# Patient Record
Sex: FEMALE | Race: WHITE | NOT HISPANIC OR LATINO
[De-identification: names, ages, dates, MRNs, and addresses within clinical notes are randomized per-mention and may not be internally consistent; named-entity substitution may affect disease eponyms.]

---

## 2020-01-13 PROBLEM — Z00.00 ENCOUNTER FOR PREVENTIVE HEALTH EXAMINATION: Status: ACTIVE | Noted: 2020-01-13

## 2020-02-06 ENCOUNTER — APPOINTMENT (OUTPATIENT)
Dept: ENDOCRINOLOGY | Facility: CLINIC | Age: 65
End: 2020-02-06
Payer: COMMERCIAL

## 2020-02-06 VITALS
DIASTOLIC BLOOD PRESSURE: 82 MMHG | HEIGHT: 65 IN | SYSTOLIC BLOOD PRESSURE: 150 MMHG | BODY MASS INDEX: 26.33 KG/M2 | TEMPERATURE: 97.8 F | HEART RATE: 55 BPM | OXYGEN SATURATION: 99 % | WEIGHT: 158 LBS

## 2020-02-06 DIAGNOSIS — M81.0 AGE-RELATED OSTEOPOROSIS W/OUT CURRENT PATHOLOGICAL FRACTURE: ICD-10-CM

## 2020-02-06 DIAGNOSIS — Z80.0 FAMILY HISTORY OF MALIGNANT NEOPLASM OF DIGESTIVE ORGANS: ICD-10-CM

## 2020-02-06 DIAGNOSIS — Z87.891 PERSONAL HISTORY OF NICOTINE DEPENDENCE: ICD-10-CM

## 2020-02-06 DIAGNOSIS — Z86.39 PERSONAL HISTORY OF OTHER ENDOCRINE, NUTRITIONAL AND METABOLIC DISEASE: ICD-10-CM

## 2020-02-06 DIAGNOSIS — Z80.9 FAMILY HISTORY OF MALIGNANT NEOPLASM, UNSPECIFIED: ICD-10-CM

## 2020-02-06 DIAGNOSIS — Z87.39 PERSONAL HISTORY OF OTHER DISEASES OF THE MUSCULOSKELETAL SYSTEM AND CONNECTIVE TISSUE: ICD-10-CM

## 2020-02-06 DIAGNOSIS — Z82.62 FAMILY HISTORY OF OSTEOPOROSIS: ICD-10-CM

## 2020-02-06 PROCEDURE — 99244 OFF/OP CNSLTJ NEW/EST MOD 40: CPT

## 2020-02-06 RX ORDER — IBANDRONATE SODIUM 150 MG/1
150 TABLET, FILM COATED ORAL
Refills: 0 | Status: ACTIVE | COMMUNITY

## 2020-02-06 RX ORDER — ROSUVASTATIN CALCIUM 10 MG/1
10 TABLET, FILM COATED ORAL
Refills: 0 | Status: ACTIVE | COMMUNITY

## 2020-02-15 ENCOUNTER — APPOINTMENT (OUTPATIENT)
Dept: ULTRASOUND IMAGING | Facility: CLINIC | Age: 65
End: 2020-02-15
Payer: COMMERCIAL

## 2020-02-15 ENCOUNTER — OUTPATIENT (OUTPATIENT)
Dept: OUTPATIENT SERVICES | Facility: HOSPITAL | Age: 65
LOS: 1 days | End: 2020-02-15

## 2020-02-15 PROCEDURE — 76536 US EXAM OF HEAD AND NECK: CPT | Mod: 26

## 2020-06-16 ENCOUNTER — APPOINTMENT (OUTPATIENT)
Dept: ENDOCRINOLOGY | Facility: CLINIC | Age: 65
End: 2020-06-16
Payer: COMMERCIAL

## 2020-06-16 VITALS
WEIGHT: 159 LBS | BODY MASS INDEX: 26.46 KG/M2 | SYSTOLIC BLOOD PRESSURE: 144 MMHG | DIASTOLIC BLOOD PRESSURE: 91 MMHG | HEART RATE: 73 BPM

## 2020-06-16 DIAGNOSIS — E04.9 NONTOXIC GOITER, UNSPECIFIED: ICD-10-CM

## 2020-06-16 PROCEDURE — 99214 OFFICE O/P EST MOD 30 MIN: CPT | Mod: 25

## 2020-06-16 PROCEDURE — 10005 FNA BX W/US GDN 1ST LES: CPT

## 2020-06-17 ENCOUNTER — RESULT REVIEW (OUTPATIENT)
Age: 65
End: 2020-06-17

## 2020-06-19 PROBLEM — E04.9 THYROID GOITER: Status: ACTIVE | Noted: 2020-02-06

## 2020-06-19 NOTE — PROCEDURE
[Fine Needle Aspiration] : Fine needle aspiration ~T ~C was performed. [Risks] : risks [Benefits] : benefits [Alternatives] : alternatives [Consent Obtained] : Written consent was obtained prior to the procedure and is detailed in the patient's record [Patient] : the patient [Supine] : The patient was placed in the supine position with the neck extended as tolerated. [Ethyl Chloride] : ethyl chloride [Alcohol] : with alcohol [25 gauge 1.5 inch] : A 25 gauge 1.5 inch needle was used [3 Passes] : 3 passes were made through the mass [Ultrasonic Guidance] : ultrasound guidance was employed [Afirma] : Afirma [Sent to Histology] : The specimens were prepared in the usual manner and sent to histology. [Tolerated Well] : the patient tolerated the procedure well [Vital Signs Stable] : the vital signs were stable [No Complications] : There were no complications [Hemostasis] : hemostasis was assured and the patient was discharged in satisfactory condition [Instructions Given] : Handouts/patient instructions were given to patient [PRN] : as needed. [de-identified] : R thyroid lobe

## 2020-06-19 NOTE — ASSESSMENT
[FreeTextEntry1] : Thyroid nodule:\par \par R upper lobe nodule measuring 2.4 x 1 x 2 cm was successfully biopsied under sonographic guidance. An extra sample for PRN Afirma testing was obtained if indeterminate results are present\par

## 2021-07-22 ENCOUNTER — APPOINTMENT (OUTPATIENT)
Dept: OTOLARYNGOLOGY | Facility: CLINIC | Age: 66
End: 2021-07-22
Payer: COMMERCIAL

## 2021-07-22 VITALS — WEIGHT: 156 LBS | TEMPERATURE: 95.8 F | HEIGHT: 66 IN | BODY MASS INDEX: 25.07 KG/M2

## 2021-07-22 DIAGNOSIS — K21.9 GASTRO-ESOPHAGEAL REFLUX DISEASE W/OUT ESOPHAGITIS: ICD-10-CM

## 2021-07-22 DIAGNOSIS — Z80.8 FAMILY HISTORY OF MALIGNANT NEOPLASM OF OTHER ORGANS OR SYSTEMS: ICD-10-CM

## 2021-07-22 DIAGNOSIS — Z81.8 FAMILY HISTORY OF OTHER MENTAL AND BEHAVIORAL DISORDERS: ICD-10-CM

## 2021-07-22 DIAGNOSIS — Z86.39 PERSONAL HISTORY OF OTHER ENDOCRINE, NUTRITIONAL AND METABOLIC DISEASE: ICD-10-CM

## 2021-07-22 DIAGNOSIS — Z80.9 FAMILY HISTORY OF MALIGNANT NEOPLASM, UNSPECIFIED: ICD-10-CM

## 2021-07-22 DIAGNOSIS — Z80.0 FAMILY HISTORY OF MALIGNANT NEOPLASM OF DIGESTIVE ORGANS: ICD-10-CM

## 2021-07-22 DIAGNOSIS — Z78.9 OTHER SPECIFIED HEALTH STATUS: ICD-10-CM

## 2021-07-22 PROCEDURE — 31575 DIAGNOSTIC LARYNGOSCOPY: CPT

## 2021-07-22 PROCEDURE — 92567 TYMPANOMETRY: CPT

## 2021-07-22 PROCEDURE — 99072 ADDL SUPL MATRL&STAF TM PHE: CPT

## 2021-07-22 PROCEDURE — 92557 COMPREHENSIVE HEARING TEST: CPT

## 2021-07-22 PROCEDURE — 99204 OFFICE O/P NEW MOD 45 MIN: CPT | Mod: 25

## 2021-07-22 RX ORDER — DIPHENHYDRAMINE HYDROCHLORIDE, ZINC ACETATE 20; 1 MG/G; MG/G
CREAM TOPICAL
Refills: 0 | Status: ACTIVE | COMMUNITY

## 2021-07-22 RX ORDER — IBANDRONATE SODIUM 150 MG/1
TABLET ORAL
Refills: 0 | Status: ACTIVE | COMMUNITY

## 2021-07-22 RX ORDER — BENZOCAINE/BENZALKONIUM/ALOE/E 5 %-0.13 %
10 CREAM (GRAM) TOPICAL
Refills: 0 | Status: ACTIVE | COMMUNITY

## 2021-07-22 RX ORDER — CHROMIUM 200 MCG
TABLET ORAL
Refills: 0 | Status: ACTIVE | COMMUNITY

## 2021-07-22 RX ORDER — PNV NO.95/FERROUS FUM/FOLIC AC 28MG-0.8MG
TABLET ORAL
Refills: 0 | Status: ACTIVE | COMMUNITY

## 2021-07-22 RX ORDER — SELENIUM 50 MCG
TABLET ORAL
Refills: 0 | Status: ACTIVE | COMMUNITY

## 2021-07-22 RX ORDER — FLUTICASONE PROPIONATE 50 MCG
SPRAY, SUSPENSION NASAL
Refills: 0 | Status: ACTIVE | COMMUNITY

## 2021-07-22 NOTE — CONSULT LETTER
[Dear  ___] : Dear  [unfilled], [Consult Letter:] : I had the pleasure of evaluating your patient, [unfilled]. [Please see my note below.] : Please see my note below. [Consult Closing:] : Thank you very much for allowing me to participate in the care of this patient.  If you have any questions, please do not hesitate to contact me. [Sincerely,] : Sincerely, [FreeTextEntry3] : Iris Winter MD\par

## 2021-07-22 NOTE — HISTORY OF PRESENT ILLNESS
[de-identified] : YVETTE VOGT is a 66 year patient Here for evaluation for hearing loss. 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 She feels that the left ear is better. She also has bowel tinnitus. She denies otalgia, diarrhea, or dizziness. There is a family history of age-related hearing loss. She had her prior audiogram from 1986 and 2016 which were reviewed.\par \par History of recurrent ear infections-she may have had recurrent infections as a child\par Prior ear surgery-no\par History of otologic trauma-no\par Noise exposure-she does have a history of some noise exposure\par Family history of hearing loss-there is a family history of age-related hearing loss\par Prior audiogram-1986 and 2016\par \par She also has a second problem of allergies. She has had a cough and post nasal drip since January. She said this started after she had a smoker living in the apartment below her. they have since moved.  She started using Flonase. She does have the clicking sensation in her throat at night with possible wheezing. She also has a postnasal drip. She has no throat pain, dysphagia, voice change, or bleeding. She does have throat clearing at night. She denies nasal obstruction or congestion although she is on Flonase so she is not sure if they were there before she started using the medication. She has no nasal drainage. She does not smoke. She denies a known history of reflux

## 2021-07-30 ENCOUNTER — TRANSCRIPTION ENCOUNTER (OUTPATIENT)
Age: 66
End: 2021-07-30

## 2021-08-02 ENCOUNTER — APPOINTMENT (OUTPATIENT)
Dept: OTOLARYNGOLOGY | Facility: CLINIC | Age: 66
End: 2021-08-02
Payer: COMMERCIAL

## 2021-08-02 DIAGNOSIS — R09.82 POSTNASAL DRIP: ICD-10-CM

## 2021-08-02 DIAGNOSIS — R04.0 EPISTAXIS: ICD-10-CM

## 2021-08-02 PROCEDURE — 99213 OFFICE O/P EST LOW 20 MIN: CPT | Mod: 25

## 2021-08-02 PROCEDURE — 31238 NSL/SINS NDSC SRG NSL HEMRRG: CPT

## 2021-08-02 NOTE — HISTORY OF PRESENT ILLNESS
[de-identified] : Patient seen by Dr. Winter July 22 for sensorineural hearing loss, allergic rhinitis, laryngopharyngeal reflux. He recommended hearing aid evaluation, allergy medications, Pepcid for 2-3 weeks with reflux precautions. \par \par 4 days after that visit developed intermittant left-sided epistaxis that was quite significant. She stopped for 4 days but it happened again 5 more times. This morning she had yet another episode after a coated test for work. She has had intermittent nosebleeds in the past associated with wintertime environment. She is not on any blood thinners or aspirin, denies manipulation

## 2021-08-02 NOTE — ASSESSMENT
[FreeTextEntry1] : Epistaxis, status post electrocautery. Recommended no trauma to the area, application of bacitracin, humidifier, and open-mouth sneezing. She will also avoid straining. Followup if she has further epistaxis otherwise as previously planned with Dr. Winter

## 2021-08-06 ENCOUNTER — APPOINTMENT (OUTPATIENT)
Dept: OTOLARYNGOLOGY | Facility: CLINIC | Age: 66
End: 2021-08-06
Payer: SELF-PAY

## 2021-08-06 PROCEDURE — 92591 HEARING AID EXAMINATION & SELECTION BINAURAL: CPT

## 2021-08-20 ENCOUNTER — APPOINTMENT (OUTPATIENT)
Dept: OTOLARYNGOLOGY | Facility: CLINIC | Age: 66
End: 2021-08-20
Payer: SELF-PAY

## 2021-08-20 PROCEDURE — V5261I: CUSTOM

## 2021-09-17 ENCOUNTER — APPOINTMENT (OUTPATIENT)
Dept: OTOLARYNGOLOGY | Facility: CLINIC | Age: 66
End: 2021-09-17
Payer: SELF-PAY

## 2021-09-17 PROCEDURE — 92593: CPT | Mod: NC

## 2021-10-01 ENCOUNTER — APPOINTMENT (OUTPATIENT)
Dept: OTOLARYNGOLOGY | Facility: CLINIC | Age: 66
End: 2021-10-01
Payer: SELF-PAY

## 2021-10-01 PROCEDURE — 92593: CPT | Mod: NC

## 2021-10-22 ENCOUNTER — APPOINTMENT (OUTPATIENT)
Dept: OTOLARYNGOLOGY | Facility: CLINIC | Age: 66
End: 2021-10-22
Payer: SELF-PAY

## 2021-10-22 PROCEDURE — 92593: CPT | Mod: NC

## 2021-11-05 ENCOUNTER — APPOINTMENT (OUTPATIENT)
Dept: OTOLARYNGOLOGY | Facility: CLINIC | Age: 66
End: 2021-11-05

## 2021-11-29 ENCOUNTER — APPOINTMENT (OUTPATIENT)
Dept: OTOLARYNGOLOGY | Facility: CLINIC | Age: 66
End: 2021-11-29
Payer: SELF-PAY

## 2021-11-29 PROCEDURE — 92593: CPT | Mod: NC

## 2022-01-02 ENCOUNTER — TRANSCRIPTION ENCOUNTER (OUTPATIENT)
Age: 67
End: 2022-01-02

## 2022-01-28 ENCOUNTER — APPOINTMENT (OUTPATIENT)
Dept: OTOLARYNGOLOGY | Facility: CLINIC | Age: 67
End: 2022-01-28
Payer: SELF-PAY

## 2022-01-28 PROCEDURE — 92593: CPT | Mod: NC

## 2022-02-28 ENCOUNTER — OUTPATIENT (OUTPATIENT)
Dept: OUTPATIENT SERVICES | Facility: HOSPITAL | Age: 67
LOS: 1 days | End: 2022-02-28
Payer: COMMERCIAL

## 2022-02-28 PROCEDURE — 71046 X-RAY EXAM CHEST 2 VIEWS: CPT

## 2022-02-28 PROCEDURE — 71046 X-RAY EXAM CHEST 2 VIEWS: CPT | Mod: 26

## 2022-03-02 ENCOUNTER — FORM ENCOUNTER (OUTPATIENT)
Age: 67
End: 2022-03-02

## 2022-03-04 ENCOUNTER — LABORATORY RESULT (OUTPATIENT)
Age: 67
End: 2022-03-04

## 2022-03-04 ENCOUNTER — APPOINTMENT (OUTPATIENT)
Dept: PULMONOLOGY | Facility: CLINIC | Age: 67
End: 2022-03-04
Payer: COMMERCIAL

## 2022-03-04 VITALS
WEIGHT: 140 LBS | BODY MASS INDEX: 22.5 KG/M2 | DIASTOLIC BLOOD PRESSURE: 101 MMHG | HEART RATE: 79 BPM | OXYGEN SATURATION: 98 % | RESPIRATION RATE: 12 BRPM | SYSTOLIC BLOOD PRESSURE: 150 MMHG | TEMPERATURE: 97 F | HEIGHT: 66 IN

## 2022-03-04 PROCEDURE — 99204 OFFICE O/P NEW MOD 45 MIN: CPT

## 2022-03-04 NOTE — HISTORY OF PRESENT ILLNESS
[TextBox_4] : 65 yo F with history of mild asthma, HLD, osteoporosis, Hypothyroidism, here for management of her asthma. \par A few weeks started coughing, wheezing, post nasal drip and "feeling crummy" overall. Thought that she had silent reflux reflux, for which she saw the GI but her breathing was more of a dire issue. Then Dr. Shah started her on Singulair 4 days ago and it has made a difference. Overall, feels btter but still has coughing fits and wheezing at night. given these recent symptoms she is unable to walk as much as she used to and now the walk to work which previously took her 20 min takes an hour.  long time ago was allergic to albuterol.\par \par SH: Never smoker. no drug use. 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

## 2022-03-04 NOTE — PHYSICAL EXAM
[No Acute Distress] : no acute distress [Turbinate hypertrophy] : turbinate hypertrophy [Supple] : supple [Normal Rate/Rhythm] : normal rate/rhythm [Normal S1, S2] : normal s1, s2 [No Murmurs] : no murmurs [No Resp Distress] : no resp distress [No Acc Muscle Use] : no acc muscle use [Wheeze] : wheeze [Gait - Sufficient For Exercise Testing] : gait sufficient for exercise testing [No Clubbing] : no clubbing [No Edema] : no edema [Oriented x3] : oriented x3 [Normal Affect] : normal affect

## 2022-03-04 NOTE — ASSESSMENT
[FreeTextEntry1] : Data Reviewed: \par CXR 2-28-22 clear lung fields\par \par Impression/Plan:\par 1. Asthma currently uncontrolled\par - trial of breo (sample given), c/w singulair\par - check IgE, asthma profile, eosinophil today\par - pft prior to next visit\par \par 2. Post nasal drip\par - nasal slaine rinses\par \par RTC in 2 weeks

## 2022-03-04 NOTE — CONSULT LETTER
[Dear  ___] : Dear  [unfilled], [Consult Letter:] : I had the pleasure of evaluating your patient, [unfilled]. [Please see my note below.] : Please see my note below. [Consult Closing:] : Thank you very much for allowing me to participate in the care of this patient.  If you have any questions, please do not hesitate to contact me. [DrCarmen  ___] : Dr. AHNNA

## 2022-03-04 NOTE — REVIEW OF SYSTEMS
[Fatigue] : fatigue [Postnasal Drip] : postnasal drip [Cough] : cough [Wheezing] : wheezing [Negative] : Endocrine [Sputum] : no sputum [GERD] : no gerd [Abdominal Pain] : no abdominal pain [Diarrhea] : no diarrhea [Headache] : no headache [Dizziness] : no dizziness

## 2022-03-06 LAB — EOSINOPHIL # BLD MANUAL: 940 /UL

## 2022-03-11 LAB
A ALTERNATA IGE QN: <0.1 KUA/L
A FUMIGATUS IGE QN: <0.1 KUA/L
C ALBICANS IGE QN: <0.1 KUA/L
C HERBARUM IGE QN: <0.1 KUA/L
CAT DANDER IGE QN: <0.1 KUA/L
COMMON RAGWEED IGE QN: 0.51 KUA/L
D FARINAE IGE QN: <0.1 KUA/L
D PTERONYSS IGE QN: <0.1 KUA/L
DEPRECATED A ALTERNATA IGE RAST QL: 0
DEPRECATED A FUMIGATUS IGE RAST QL: 0
DEPRECATED C ALBICANS IGE RAST QL: 0
DEPRECATED C HERBARUM IGE RAST QL: 0
DEPRECATED CAT DANDER IGE RAST QL: 0
DEPRECATED COMMON RAGWEED IGE RAST QL: 1
DEPRECATED D FARINAE IGE RAST QL: 0
DEPRECATED D PTERONYSS IGE RAST QL: 0
DEPRECATED DOG DANDER IGE RAST QL: 0
DEPRECATED M RACEMOSUS IGE RAST QL: 0
DEPRECATED ROACH IGE RAST QL: 0
DEPRECATED TIMOTHY IGE RAST QL: 0
DEPRECATED WHITE OAK IGE RAST QL: 0
DOG DANDER IGE QN: <0.1 KUA/L
M RACEMOSUS IGE QN: <0.1 KUA/L
ROACH IGE QN: <0.1 KUA/L
TIMOTHY IGE QN: <0.1 KUA/L
TOTAL IGE SMQN RAST: 71 KU/L
WHITE OAK IGE QN: <0.1 KUA/L

## 2022-03-14 ENCOUNTER — LABORATORY RESULT (OUTPATIENT)
Age: 67
End: 2022-03-14

## 2022-03-17 ENCOUNTER — APPOINTMENT (OUTPATIENT)
Dept: PULMONOLOGY | Facility: CLINIC | Age: 67
End: 2022-03-17
Payer: COMMERCIAL

## 2022-03-17 PROCEDURE — 94729 DIFFUSING CAPACITY: CPT

## 2022-03-17 PROCEDURE — 94727 GAS DIL/WSHOT DETER LNG VOL: CPT

## 2022-03-17 PROCEDURE — 99214 OFFICE O/P EST MOD 30 MIN: CPT | Mod: 25

## 2022-03-17 PROCEDURE — 94060 EVALUATION OF WHEEZING: CPT

## 2022-03-17 PROCEDURE — ZZZZZ: CPT

## 2022-03-17 NOTE — HISTORY OF PRESENT ILLNESS
[TextBox_4] : 3-17-22 doing signficantly better on breo. stopped singulair three  day ago. no difficulty with dyspnea. \par \par 3-4-22\par 65 yo F with history of mild asthma, HLD, osteoporosis, Hypothyroidism, here for management of her asthma. \par A few weeks started coughing, wheezing, post nasal drip and "feeling crummy" overall. Thought that she had silent reflux reflux, for which she saw the GI but her breathing was more of a dire issue. Then Dr. Shah started her on Singulair 4 days ago and it has made a difference. Overall, feels btter but still has coughing fits and wheezing at night. given these recent symptoms she is unable to walk as much as she used to and now the walk to work which previously took her 20 min takes an hour.  long time ago was allergic to albuterol.\par \par SH: Never smoker. no drug use. 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

## 2022-03-17 NOTE — REVIEW OF SYSTEMS
[Wheezing] : wheezing [Negative] : Endocrine [Fatigue] : no fatigue [Postnasal Drip] : no postnasal drip [Cough] : no cough [Sputum] : no sputum [GERD] : no gerd [Abdominal Pain] : no abdominal pain [Diarrhea] : no diarrhea [Headache] : no headache [Dizziness] : no dizziness

## 2022-03-17 NOTE — ASSESSMENT
[FreeTextEntry1] : Data Reviewed: \par CXR 2-28-22 clear lung fields\par 3-2022 IgE 71, Eosinophil levels 940 \par \par PFT 3-17-22: Normal. no evidence of obstruction. \par \par Impression/Plan:\par 1. cough variant asthma\par - continue breo 100\par - stop singulair \par \par 2. Post nasal drip\par - nasal slaine rinses\par \par 3. HCM\par Flu 9-2021\par Covid-19 completed vaccination, and booster\par Pneumovax received PCV 13 in 2020 and PSV 23 in 2021\par \par RTC in 3-4 months

## 2022-03-17 NOTE — PHYSICAL EXAM
[No Acute Distress] : no acute distress [Turbinate hypertrophy] : turbinate hypertrophy [Supple] : supple [Normal Rate/Rhythm] : normal rate/rhythm [Normal S1, S2] : normal s1, s2 [No Murmurs] : no murmurs [No Resp Distress] : no resp distress [No Acc Muscle Use] : no acc muscle use [Gait - Sufficient For Exercise Testing] : gait sufficient for exercise testing [No Clubbing] : no clubbing [No Edema] : no edema [Oriented x3] : oriented x3 [Normal Affect] : normal affect [Clear to Auscultation Bilaterally] : clear to auscultation bilaterally

## 2022-04-06 RX ORDER — BUDESONIDE AND FORMOTEROL FUMARATE DIHYDRATE 80; 4.5 UG/1; UG/1
80-4.5 AEROSOL RESPIRATORY (INHALATION)
Qty: 1 | Refills: 6 | Status: ACTIVE | COMMUNITY
Start: 2022-04-06 | End: 1900-01-01

## 2022-04-06 RX ORDER — ALBUTEROL SULFATE 90 UG/1
108 (90 BASE) INHALANT RESPIRATORY (INHALATION)
Qty: 1 | Refills: 3 | Status: ACTIVE | COMMUNITY
Start: 2022-04-06 | End: 1900-01-01

## 2022-05-27 ENCOUNTER — APPOINTMENT (OUTPATIENT)
Dept: OTOLARYNGOLOGY | Facility: CLINIC | Age: 67
End: 2022-05-27
Payer: SELF-PAY

## 2022-05-27 PROCEDURE — 92593: CPT | Mod: NC

## 2022-07-20 ENCOUNTER — APPOINTMENT (OUTPATIENT)
Dept: PULMONOLOGY | Facility: CLINIC | Age: 67
End: 2022-07-20

## 2022-07-20 VITALS
WEIGHT: 141 LBS | DIASTOLIC BLOOD PRESSURE: 77 MMHG | SYSTOLIC BLOOD PRESSURE: 118 MMHG | HEART RATE: 70 BPM | BODY MASS INDEX: 23.49 KG/M2 | HEIGHT: 65 IN | TEMPERATURE: 98.2 F | OXYGEN SATURATION: 97 %

## 2022-07-20 DIAGNOSIS — J45.909 UNSPECIFIED ASTHMA, UNCOMPLICATED: ICD-10-CM

## 2022-07-20 DIAGNOSIS — R05.9 COUGH, UNSPECIFIED: ICD-10-CM

## 2022-07-20 PROCEDURE — 94060 EVALUATION OF WHEEZING: CPT | Mod: 1L

## 2022-07-20 PROCEDURE — 99214 OFFICE O/P EST MOD 30 MIN: CPT

## 2022-07-20 RX ORDER — FLUTICASONE FUROATE AND VILANTEROL TRIFENATATE 100; 25 UG/1; UG/1
100-25 POWDER RESPIRATORY (INHALATION) DAILY
Qty: 1 | Refills: 6 | Status: DISCONTINUED | COMMUNITY
Start: 2022-04-06 | End: 2022-07-20

## 2022-07-20 RX ORDER — FLUTICASONE PROPIONATE 100 UG/1
100 POWDER, METERED RESPIRATORY (INHALATION) TWICE DAILY
Qty: 1 | Refills: 6 | Status: ACTIVE | COMMUNITY
Start: 2022-07-20 | End: 1900-01-01

## 2022-07-20 NOTE — PHYSICAL EXAM
[No Acute Distress] : no acute distress [Turbinate hypertrophy] : turbinate hypertrophy [Supple] : supple [Normal Rate/Rhythm] : normal rate/rhythm [Normal S1, S2] : normal s1, s2 [No Murmurs] : no murmurs [No Resp Distress] : no resp distress [No Acc Muscle Use] : no acc muscle use [Clear to Auscultation Bilaterally] : clear to auscultation bilaterally [Gait - Sufficient For Exercise Testing] : gait sufficient for exercise testing [No Clubbing] : no clubbing [No Edema] : no edema [Oriented x3] : oriented x3 [Normal Affect] : normal affect

## 2022-07-21 NOTE — END OF VISIT
[] : Fellow [FreeTextEntry3] : cough variant asthma with high eosinophils - step down from laba/ics to ics only. pft at next visit in 4-6 months

## 2022-07-21 NOTE — HISTORY OF PRESENT ILLNESS
[TextBox_4] : 7-20-22 No symptoms. Has been completely controlled on Breo. Wants to stop due to cost concerns and long term side effects. Has not used rescue inhlaer once since on Breo and no night time awakenings. \par \par 3-17-22 doing signficantly better on breo. stopped singulair three  day ago. no difficulty with dyspnea. \par \par 3-4-22\par 65 yo F with history of mild asthma, HLD, osteoporosis, Hypothyroidism, here for management of her asthma. \par A few weeks started coughing, wheezing, post nasal drip and "feeling crummy" overall. Thought that she had silent reflux reflux, for which she saw the GI but her breathing was more of a dire issue. Then Dr. Shah started her on Singulair 4 days ago and it has made a difference. Overall, feels btter but still has coughing fits and wheezing at night. given these recent symptoms she is unable to walk as much as she used to and now the walk to work which previously took her 20 min takes an hour.  long time ago was allergic to albuterol.\par \par SH: Never smoker. 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

## 2022-07-21 NOTE — ASSESSMENT
[FreeTextEntry1] : Data Reviewed: \par CXR 2-28-22 clear lung fields\par 3-2022 IgE 71, Eosinophil levels 940 \par \par PFT 3-17-22: Normal. no evidence of obstruction. \par \par Impression/Plan:\par 1. cough variant asthma\par - will DC breo and step down to ICS only therapy; rx for flovent sent to pharmacy \par - if tolerating ICS monotherapy, will consider DCing at next visit\par \par 2.HCM\par Flu 9-2021\par Covid-19 completed vaccination, and booster\par Pneumovax received PCV 13 in 2020 and PSV 23 in 2021\par \par RTC in 3-4 months; will repeat spirometry at this time and discuss stopping meds

## 2022-07-26 ENCOUNTER — APPOINTMENT (OUTPATIENT)
Dept: OTOLARYNGOLOGY | Facility: CLINIC | Age: 67
End: 2022-07-26

## 2022-07-26 VITALS — HEIGHT: 65 IN | WEIGHT: 141 LBS | BODY MASS INDEX: 23.49 KG/M2 | TEMPERATURE: 93.3 F

## 2022-07-26 DIAGNOSIS — H61.23 IMPACTED CERUMEN, BILATERAL: ICD-10-CM

## 2022-07-26 PROCEDURE — 99072 ADDL SUPL MATRL&STAF TM PHE: CPT

## 2022-07-26 PROCEDURE — 92557 COMPREHENSIVE HEARING TEST: CPT

## 2022-07-26 PROCEDURE — 99213 OFFICE O/P EST LOW 20 MIN: CPT | Mod: 25

## 2022-07-26 PROCEDURE — 92567 TYMPANOMETRY: CPT

## 2022-07-26 PROCEDURE — G0268 REMOVAL OF IMPACTED WAX MD: CPT

## 2022-07-26 RX ORDER — LEVOTHYROXINE SODIUM 0.05 MG/1
50 TABLET ORAL
Qty: 90 | Refills: 0 | Status: ACTIVE | COMMUNITY
Start: 2022-01-06

## 2022-07-26 RX ORDER — NIRMATRELVIR AND RITONAVIR 300-100 MG
20 X 150 MG & KIT ORAL
Qty: 30 | Refills: 0 | Status: ACTIVE | COMMUNITY
Start: 2022-05-13

## 2022-07-26 NOTE — HISTORY OF PRESENT ILLNESS
[de-identified] : YVETTE VOGT is a 67 year woman with a history of presbycusis who uses hearing aids.\par

## 2022-08-08 ENCOUNTER — NON-APPOINTMENT (OUTPATIENT)
Age: 67
End: 2022-08-08

## 2022-09-30 ENCOUNTER — APPOINTMENT (OUTPATIENT)
Dept: OTOLARYNGOLOGY | Facility: CLINIC | Age: 67
End: 2022-09-30

## 2022-09-30 PROCEDURE — 92593: CPT | Mod: NC

## 2022-12-12 ENCOUNTER — RX RENEWAL (OUTPATIENT)
Age: 67
End: 2022-12-12

## 2022-12-14 ENCOUNTER — APPOINTMENT (OUTPATIENT)
Dept: PULMONOLOGY | Facility: CLINIC | Age: 67
End: 2022-12-14

## 2022-12-14 VITALS
SYSTOLIC BLOOD PRESSURE: 119 MMHG | BODY MASS INDEX: 23.9 KG/M2 | DIASTOLIC BLOOD PRESSURE: 72 MMHG | HEART RATE: 61 BPM | OXYGEN SATURATION: 98 % | HEIGHT: 64 IN | WEIGHT: 140 LBS | TEMPERATURE: 97.9 F

## 2022-12-14 DIAGNOSIS — J82.89 OTHER PULMONARY EOSINOPHILIA, NOT ELSEWHERE CLASSIFIED: ICD-10-CM

## 2022-12-14 PROCEDURE — 99214 OFFICE O/P EST MOD 30 MIN: CPT | Mod: 25

## 2022-12-14 PROCEDURE — 94010 BREATHING CAPACITY TEST: CPT

## 2022-12-14 PROCEDURE — 99072 ADDL SUPL MATRL&STAF TM PHE: CPT

## 2022-12-14 RX ORDER — MONTELUKAST 10 MG/1
10 TABLET, FILM COATED ORAL
Qty: 14 | Refills: 0 | Status: DISCONTINUED | COMMUNITY
Start: 2022-02-28 | End: 2022-12-14

## 2022-12-14 NOTE — PHYSICAL EXAM
[No Acute Distress] : no acute distress [Erythema] : erythema [Turbinate hypertrophy] : turbinate hypertrophy [Normal Appearance] : normal appearance [No Neck Mass] : no neck mass [Normal Rate/Rhythm] : normal rate/rhythm [Normal S1, S2] : normal s1, s2 [No Murmurs] : no murmurs [No Resp Distress] : no resp distress [Clear to Auscultation Bilaterally] : clear to auscultation bilaterally [No Abnormalities] : no abnormalities [Benign] : benign [Normal Gait] : normal gait [No Clubbing] : no clubbing [No Cyanosis] : no cyanosis [No Edema] : no edema [FROM] : FROM [Normal Color/ Pigmentation] : normal color/ pigmentation [No Focal Deficits] : no focal deficits [Oriented x3] : oriented x3 [Normal Affect] : normal affect

## 2022-12-15 NOTE — HISTORY OF PRESENT ILLNESS
[Never] : never [TextBox_4] : 12/14/2022\par States that she is coughing significantly less and is not struggling to breath like in the past while on the Breo. She is really happy with the outcomes. Patient is wondering if she can cut down on her Breo or go to a cheaper inhlaer. No significant night time awakenings. No wheezing. Denies any f/c, ns, or weight loss. Mentions post-nasal drip and has tried flonase in the past and has caused epistaxis requiring cauterization; her post-nasal drips are not significant per the patient.\par \par 7-20-22 No symptoms. Has been completely controlled on Breo. Wants to stop due to cost concerns and long term side effects. Has not used rescue inhlaer once since on Breo and no night time awakenings. \par \par 3-17-22 doing signficantly better on breo. stopped singulair three day ago. no difficulty with dyspnea. \par \par 3-4-22\par 67 yo F with history of mild asthma, HLD, osteoporosis, Hypothyroidism, here for management of her asthma. \par A few weeks started coughing, wheezing, post nasal drip and "feeling crummy" overall. Thought that she had silent reflux reflux, for which she saw the GI but her breathing was more of a dire issue. Then Dr. Shah started her on Singulair 4 days ago and it has made a difference. Overall, feels btter but still has coughing fits and wheezing at night. given these recent symptoms she is unable to walk as much as she used to and now the walk to work which previously took her 20 min takes an hour. long time ago was allergic to albuterol.\par \par SH: Never smoker. 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

## 2022-12-15 NOTE — END OF VISIT
[] : Fellow [FreeTextEntry3] : I personally discussed this patient with the Fellow at the time of the visit. I agree with the assessment and plan as written, unless noted below. \par I was present with the Fellow during the key portions of the history and exam. I agree with the findings and plan as documented in the Fellow 's note, unless noted below. \par

## 2022-12-15 NOTE — REVIEW OF SYSTEMS
[Fever] : no fever [Chills] : no chills [Recent Wt Loss (___ Lbs)] : ~T no recent weight loss [Cough] : no cough [Chest Tightness] : no chest tightness [Sputum] : no sputum [Dyspnea] : no dyspnea [Chest Discomfort] : no chest discomfort [Nausea] : no nausea [Vomiting] : no vomiting [Anxiety] : no anxiety

## 2022-12-15 NOTE — ASSESSMENT
[FreeTextEntry1] : Data reviewed:\par PFT 3-17-22 FVC 93%, FEV1 97%, FEV1/FVC 0.79. no signficant bronchodilator response. TLC 92%, DLCO 70% \par Spirometry on 12/14/22: FVC 80%, FEV1/AEL086, FEV1 85%.\par eosinophil count 940, igE normal \par \par Impression/Plan:\par 1. Reactive airway disease vs NAEB \par Eosinophil count of 940. No elevation in IgE. Asthma profile was positive.\par -Breo PRN instead of daily\par -F/u in 6 months with PFTs

## 2023-01-31 ENCOUNTER — APPOINTMENT (OUTPATIENT)
Dept: OTOLARYNGOLOGY | Facility: CLINIC | Age: 68
End: 2023-01-31
Payer: COMMERCIAL

## 2023-01-31 PROCEDURE — 92593: CPT | Mod: NC

## 2023-06-14 ENCOUNTER — APPOINTMENT (OUTPATIENT)
Dept: PULMONOLOGY | Facility: CLINIC | Age: 68
End: 2023-06-14

## 2023-06-26 ENCOUNTER — NON-APPOINTMENT (OUTPATIENT)
Age: 68
End: 2023-06-26

## 2023-06-29 ENCOUNTER — APPOINTMENT (OUTPATIENT)
Dept: PULMONOLOGY | Facility: CLINIC | Age: 68
End: 2023-06-29

## 2023-07-02 ENCOUNTER — NON-APPOINTMENT (OUTPATIENT)
Age: 68
End: 2023-07-02

## 2023-07-06 ENCOUNTER — APPOINTMENT (OUTPATIENT)
Dept: PULMONOLOGY | Facility: CLINIC | Age: 68
End: 2023-07-06
Payer: COMMERCIAL

## 2023-07-06 ENCOUNTER — OUTPATIENT (OUTPATIENT)
Dept: OUTPATIENT SERVICES | Facility: HOSPITAL | Age: 68
LOS: 1 days | End: 2023-07-06
Payer: COMMERCIAL

## 2023-07-06 DIAGNOSIS — J45.909 UNSPECIFIED ASTHMA, UNCOMPLICATED: ICD-10-CM

## 2023-07-06 PROCEDURE — 94070 EVALUATION OF WHEEZING: CPT | Mod: 26

## 2023-07-06 PROCEDURE — 99443: CPT

## 2023-07-06 PROCEDURE — 94070 EVALUATION OF WHEEZING: CPT

## 2023-07-06 RX ORDER — ALBUTEROL 90 UG/1
2 AEROSOL, METERED ORAL ONCE
Refills: 0 | Status: DISCONTINUED | OUTPATIENT
Start: 2023-07-06 | End: 2023-07-21

## 2023-07-06 NOTE — REASON FOR VISIT
[Home] : at home, [unfilled] , at the time of the visit. [Medical Office: ([facility])___] : at the medical office located in  [Verbal consent obtained from patient] : the patient, [unfilled] [Follow-Up] : a follow-up visit

## 2023-07-09 NOTE — ASSESSMENT
[FreeTextEntry1] : Data reviewed:\par PFT 3-17-22 FVC 93%, FEV1 97%, FEV1/FVC 0.79. no significant bronchodilator response. TLC 92%, DLCO 70% \par Spirometry on 12/14/22: FVC 80%, FEV1/TJB361, FEV1 85%.\par eosinophil count 940, igE normal \par \par methacholine challenge test done today was positive \par \par Impression/Plan:\par 1. Asthma\par \par Eosinophil count of 940. No elevation in IgE. Asthma profile was positive and methacholine challenge test was positive. \par - c/w breo daily since subjective improvement in symptoms and no need of rescue inhaler while on breo\par - albuterol PRN\par \par RTC in 4 months

## 2023-07-09 NOTE — HISTORY OF PRESENT ILLNESS
[TextBox_4] : 7-6-23 using breo daily since february and while on breo has not had to use her rescue inhaler. \par \par 12/14/2022\par States that she is coughing significantly less and is not struggling to breath like in the past while on the Breo. She is really happy with the outcomes. Patient is wondering if she can cut down on her Breo or go to a cheaper inhaler. No significant night time awakenings. No wheezing. Denies any f/c, ns, or weight loss. Mentions post-nasal drip and has tried Flonase in the past and has caused epistaxis requiring cauterization; her post-nasal drips are not significant per the patient.\par \par 3-4-22\par 65 yo F with history of mild asthma, HLD, osteoporosis, Hypothyroidism, here for management of her asthma. \par A few weeks started coughing, wheezing, post nasal drip and "feeling crummy" overall. Thought that she had silent reflux reflux, for which she saw the GI but her breathing was more of a dire issue. Then Dr. Shah started her on Singulair 4 days ago and it has made a difference. Overall, feels btter but still has coughing fits and wheezing at night. given these recent symptoms she is unable to walk as much as she used to and now the walk to work which previously took her 20 min takes an hour.  long time ago was allergic to albuterol.\par \par SH: Never smoker. 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

## 2023-07-11 ENCOUNTER — APPOINTMENT (OUTPATIENT)
Dept: OTOLARYNGOLOGY | Facility: CLINIC | Age: 68
End: 2023-07-11
Payer: SELF-PAY

## 2023-07-11 PROCEDURE — 92593: CPT | Mod: NC

## 2023-08-01 ENCOUNTER — APPOINTMENT (OUTPATIENT)
Dept: OTOLARYNGOLOGY | Facility: CLINIC | Age: 68
End: 2023-08-01
Payer: COMMERCIAL

## 2023-08-01 DIAGNOSIS — H90.3 SENSORINEURAL HEARING LOSS, BILATERAL: ICD-10-CM

## 2023-08-01 PROCEDURE — 92557 COMPREHENSIVE HEARING TEST: CPT

## 2023-08-01 PROCEDURE — 92567 TYMPANOMETRY: CPT

## 2023-08-01 PROCEDURE — 99212 OFFICE O/P EST SF 10 MIN: CPT

## 2023-08-01 NOTE — HISTORY OF PRESENT ILLNESS
[de-identified] : Comes in today for yearly audiogram.  She feels that her hearing is stable from last year.  She has been using hearing aid amplification for the last 2 years.

## 2023-08-01 NOTE — PHYSICAL EXAM
[TextEntry] : PHYSICAL EXAM  General: The patient was alert and oriented and in no distress. Voice was clear.  Eyes: The patient was alert, oriented and in no distress. Voice was clear.  Eyes: Ocular motility normal. No proptosis. Conjunctiva normal. Sclera white. There was no significant nystagmus or disconjugate gaze noted.  Face: The patient had no facial asymmetry or mass. The skin was unremarkable.  Ears: External ears were normal without deformity. Ear canals were clear Tympanic membranes were intact and normal. No perforation or effusion  Nose:  The external nose had no significant deformity.  There was no facial tenderness.  On anterior rhinoscopy, the nasal mucosa was healthy in appearance. The anterior septum was midline.  There were no visualized polyps purulence  or masses. See procedure note for endonasal exam.  Oral cavity: Oral mucosa- normal. Oral and base of tongue- clear and without mass. Gingival and buccal mucosa- moist and without lesions. Palate- the palate moved well. There was no cleft palate. There appeared to be good salivary flow.   Oral cavity/oropharynx- no pus, erythema or mass.  Neck:  The neck was symmetrical. The parotid and submandibular glands were normal without masses. The trachea was midline and there was no unusual crepitus. Thyroid was smooth and nontender and no masses were palpated. Cervical adenopathy- none.

## 2023-08-01 NOTE — ASSESSMENT
[FreeTextEntry1] : Essentially unchanged..  Small progression of the low-frequency symmetric. No further work-up or treatment required. Follow-up with Dr. Williamson next week.

## 2023-08-04 PROBLEM — H90.3 SENSORINEURAL HEARING LOSS (SNHL) OF BOTH EARS: Status: ACTIVE | Noted: 2021-07-22

## 2023-08-11 ENCOUNTER — APPOINTMENT (OUTPATIENT)
Dept: OTOLARYNGOLOGY | Facility: CLINIC | Age: 68
End: 2023-08-11
Payer: SELF-PAY

## 2023-08-11 PROCEDURE — 92593: CPT | Mod: NC

## 2023-10-09 ENCOUNTER — APPOINTMENT (OUTPATIENT)
Dept: PULMONOLOGY | Facility: CLINIC | Age: 68
End: 2023-10-09
Payer: COMMERCIAL

## 2023-10-09 VITALS
BODY MASS INDEX: 24.92 KG/M2 | TEMPERATURE: 97.6 F | DIASTOLIC BLOOD PRESSURE: 87 MMHG | WEIGHT: 146 LBS | SYSTOLIC BLOOD PRESSURE: 133 MMHG | HEIGHT: 64 IN | HEART RATE: 79 BPM | OXYGEN SATURATION: 97 %

## 2023-10-09 PROCEDURE — 99214 OFFICE O/P EST MOD 30 MIN: CPT

## 2024-01-23 ENCOUNTER — APPOINTMENT (OUTPATIENT)
Dept: OTOLARYNGOLOGY | Facility: CLINIC | Age: 69
End: 2024-01-23
Payer: SELF-PAY

## 2024-01-23 PROCEDURE — 92593: CPT | Mod: NC

## 2024-02-14 ENCOUNTER — APPOINTMENT (OUTPATIENT)
Dept: PULMONOLOGY | Facility: CLINIC | Age: 69
End: 2024-02-14
Payer: COMMERCIAL

## 2024-02-14 VITALS
HEART RATE: 78 BPM | HEIGHT: 64 IN | OXYGEN SATURATION: 99 % | WEIGHT: 146 LBS | DIASTOLIC BLOOD PRESSURE: 74 MMHG | BODY MASS INDEX: 24.92 KG/M2 | TEMPERATURE: 96.3 F | SYSTOLIC BLOOD PRESSURE: 120 MMHG

## 2024-02-14 DIAGNOSIS — J45.909 UNSPECIFIED ASTHMA, UNCOMPLICATED: ICD-10-CM

## 2024-02-14 PROCEDURE — 99213 OFFICE O/P EST LOW 20 MIN: CPT | Mod: GC

## 2024-02-14 NOTE — HISTORY OF PRESENT ILLNESS
[Never] : never [TextBox_4] : 02/14/2024 [Zahra]: Former pt of Dr Nazario last seen Oct 2023 for asthma, on Breo. No exacerbations since her last visit. Continues to use Breo PRN, last use over a month ago. Received her RSV vaccination in addition to other routine vaccinations. No other complaints. Thought she was starting to have an exacerbation in December, but responded well to PRN usage of Breo.  [ESS] : 8

## 2024-02-14 NOTE — ASSESSMENT
[FreeTextEntry1] : Data reviewed:  Labs 3/2022: eos 940/ul, IgE 71  PFT 3/2022: entirely normal, no BD response Felix 7/2023: normal  MCT 7/2023: PC20 = 5, c/w borderline BHR  Impression: #Asthma, Mild Intermittent  Plan: - c/w Breo PRN; also has albuterol if needed emergently  - UTD on all vaccinations, including RSV - no need for repeat PFTs unless there is a clinical change   RTC in six months, sooner PRN  -- Attending Addendum  Seen and examined by me and agree w above. Asthma by MCT, does well cycling off and on Breo as needed. UTD on vax. Can return in 6 mos, sooner as needed.

## 2024-04-16 ENCOUNTER — APPOINTMENT (OUTPATIENT)
Dept: PULMONOLOGY | Facility: CLINIC | Age: 69
End: 2024-04-16

## 2024-06-03 RX ORDER — FLUTICASONE FUROATE AND VILANTEROL 100; 25 UG/1; UG/1
100-25 POWDER RESPIRATORY (INHALATION)
Qty: 60 | Refills: 6 | Status: ACTIVE | COMMUNITY
Start: 2022-12-12 | End: 1900-01-01

## 2024-07-16 ENCOUNTER — APPOINTMENT (OUTPATIENT)
Dept: OTOLARYNGOLOGY | Facility: CLINIC | Age: 69
End: 2024-07-16
Payer: SELF-PAY

## 2024-07-16 PROCEDURE — 92593: CPT | Mod: NC

## 2024-08-01 ENCOUNTER — APPOINTMENT (OUTPATIENT)
Dept: PULMONOLOGY | Facility: CLINIC | Age: 69
End: 2024-08-01
Payer: COMMERCIAL

## 2024-08-01 VITALS
WEIGHT: 146.13 LBS | DIASTOLIC BLOOD PRESSURE: 78 MMHG | TEMPERATURE: 97.8 F | SYSTOLIC BLOOD PRESSURE: 114 MMHG | HEIGHT: 64 IN | BODY MASS INDEX: 24.95 KG/M2 | OXYGEN SATURATION: 95 % | HEART RATE: 78 BPM

## 2024-08-01 PROCEDURE — 99213 OFFICE O/P EST LOW 20 MIN: CPT | Mod: 25,GC

## 2024-08-01 PROCEDURE — 94010 BREATHING CAPACITY TEST: CPT | Mod: GC

## 2024-08-01 PROCEDURE — 95012 NITRIC OXIDE EXP GAS DETER: CPT | Mod: GC

## 2024-08-01 NOTE — HISTORY OF PRESENT ILLNESS
[Never] : never [TextBox_4] : 2024 [Zahra]: Former pt of Dr Nazario last seen Oct 2023 for asthma, on Breo. No exacerbations since her last visit. Continues to use Breo PRN, last use over a month ago. Received her RSV vaccination in addition to other routine vaccinations. No other complaints. Thought she was starting to have an exacerbation in December, but responded well to PRN usage of Breo.   2024 [Tu]: Has been using Breo PRN since last visit until a couple months ago when she saw it was about to  so starting using daily as to not waste it. Went back to using it PRN once it ran out, using it minimally until about 10 days ago when she noted to have a cough with clear mucous and wheezing which she feels was triggered by the worsening air quality, now has used Breo daily for the last week with improvement of symptoms. Also has albuterol, but has never used it.

## 2024-08-01 NOTE — REVIEW OF SYSTEMS
[Cough] : cough [Wheezing] : wheezing [Fever] : no fever [Chills] : no chills [Postnasal Drip] : no postnasal drip [Hemoptysis] : no hemoptysis [Chest Tightness] : no chest tightness [Dyspnea] : no dyspnea [SOB on Exertion] : no sob on exertion [Chest Discomfort] : no chest discomfort [Nasal Discharge] : no nasal discharge [Diarrhea] : no diarrhea [Constipation] : no constipation [Myalgias] : no myalgias [Headache] : no headache [Dizziness] : no dizziness

## 2024-08-01 NOTE — PHYSICAL EXAM
[No Acute Distress] : no acute distress [Normal Oropharynx] : normal oropharynx [Normal Appearance] : normal appearance [Normal Rate/Rhythm] : normal rate/rhythm [Normal S1, S2] : normal s1, s2 [No Murmurs] : no murmurs [No Resp Distress] : no resp distress [No Acc Muscle Use] : no acc muscle use [Clear to Auscultation Bilaterally] : clear to auscultation bilaterally [No Abnormalities] : no abnormalities [Benign] : benign [No Clubbing] : no clubbing [No Edema] : no edema [Normal Color/ Pigmentation] : normal color/ pigmentation [No Focal Deficits] : no focal deficits [Oriented x3] : oriented x3 [Normal Affect] : normal affect

## 2024-08-01 NOTE — ASSESSMENT
[FreeTextEntry1] : Data reviewed:  Labs 3/2022: eos 940/ul, IgE 71  PFT 3/2022: entirely normal, no BD response Jennifer 7/2023: normal  MCT 7/2023: PC20 = 5, c/w borderline BHR Jennifer 8/1/2024: normal, FEV1 87% / FENO 112  Impression: #Asthma, Mild Intermittent  Plan: - Continue with Breo PRN, educated on using for days at a time, not sporadically  - Has albuterol if needed emergently  - Encouraged to get fall COVID vaccine  -- Attending Addendum  Seen and examined by me and agree w above. On Breo now for increased symptoms, with normal jennifer but elevated FENO. Can continue with cycling off and on Breo. Return 6 mos.

## 2024-08-19 ENCOUNTER — APPOINTMENT (OUTPATIENT)
Dept: OTOLARYNGOLOGY | Facility: CLINIC | Age: 69
End: 2024-08-19
Payer: SELF-PAY

## 2024-08-19 PROCEDURE — 92593: CPT | Mod: NC

## 2024-08-26 NOTE — ASSESSMENT
Discharge instructions reviewed and understanding verbalized per pt/family with copy given. All home medications/new prescriptions have been reviewed, questions answered and patient/family state understanding. Medication information sheet provided for new prescriptions received when applicable   [FreeTextEntry1] : She has bilateral sensorineural hearing loss and mild tinnitus. Her exam was reviewed and compared to her prior tests\par \par She has a cough and post nasal drip at night. She has been using Flonase. She had reflux-related laryngeal changes on flexible laryngoscopy.\par \par PLAN\par \par -findings and management options discussed in detail with the patient. \par -good aural hygiene\par -avoid using cotton swabs in the ears\par -wax removal drops as needed. \par -noise precautions\par -annual audiogram\par -HAE recommended\par -Nasal steroid sprays and allergy medications as needed. She may consider allergy testing. She may also consider pulmonary evaluation if she has wheezing\par -Reflux precautions, elevation of head of bed at night.\par -She may consider a trial of OTC Pepcid for 2-3 weeks. \par -I spoke with her about further workup for reflux including GI evaluation, barium swallow, pH probe testing. She will think this over\par -follow up in 2 months. We'll discuss further workup of a cough and post nasal drip if she is not improving\par -call and return earlier if any concerns. \par

## 2024-10-08 ENCOUNTER — APPOINTMENT (OUTPATIENT)
Dept: OTOLARYNGOLOGY | Facility: CLINIC | Age: 69
End: 2024-10-08
Payer: COMMERCIAL

## 2024-10-08 ENCOUNTER — APPOINTMENT (OUTPATIENT)
Dept: OTOLARYNGOLOGY | Facility: CLINIC | Age: 69
End: 2024-10-08
Payer: SELF-PAY

## 2024-10-08 DIAGNOSIS — H90.3 SENSORINEURAL HEARING LOSS, BILATERAL: ICD-10-CM

## 2024-10-08 PROCEDURE — 92557 COMPREHENSIVE HEARING TEST: CPT

## 2024-10-08 PROCEDURE — V5299I: CUSTOM

## 2024-10-08 PROCEDURE — 92567 TYMPANOMETRY: CPT

## 2024-10-08 PROCEDURE — 99212 OFFICE O/P EST SF 10 MIN: CPT

## 2024-11-05 ENCOUNTER — APPOINTMENT (OUTPATIENT)
Dept: OTOLARYNGOLOGY | Facility: CLINIC | Age: 69
End: 2024-11-05
Payer: SELF-PAY

## 2024-11-05 PROCEDURE — 92593: CPT | Mod: NC

## 2024-12-10 ENCOUNTER — APPOINTMENT (OUTPATIENT)
Dept: OTOLARYNGOLOGY | Facility: CLINIC | Age: 69
End: 2024-12-10
Payer: SELF-PAY

## 2024-12-10 PROCEDURE — 92593: CPT | Mod: NC

## 2025-02-03 ENCOUNTER — NON-APPOINTMENT (OUTPATIENT)
Age: 70
End: 2025-02-03

## 2025-02-03 ENCOUNTER — APPOINTMENT (OUTPATIENT)
Dept: PULMONOLOGY | Facility: CLINIC | Age: 70
End: 2025-02-03
Payer: COMMERCIAL

## 2025-02-03 VITALS
HEIGHT: 64 IN | TEMPERATURE: 97.4 F | HEART RATE: 70 BPM | OXYGEN SATURATION: 96 % | WEIGHT: 149 LBS | BODY MASS INDEX: 25.44 KG/M2 | DIASTOLIC BLOOD PRESSURE: 70 MMHG | SYSTOLIC BLOOD PRESSURE: 106 MMHG

## 2025-02-03 DIAGNOSIS — J45.909 UNSPECIFIED ASTHMA, UNCOMPLICATED: ICD-10-CM

## 2025-02-03 PROCEDURE — 99213 OFFICE O/P EST LOW 20 MIN: CPT

## 2025-02-03 RX ORDER — BUDESONIDE AND FORMOTEROL FUMARATE DIHYDRATE 160; 4.5 UG/1; UG/1
160-4.5 AEROSOL RESPIRATORY (INHALATION) TWICE DAILY
Qty: 1 | Refills: 5 | Status: ACTIVE | COMMUNITY
Start: 2025-02-03 | End: 1900-01-01

## 2025-02-03 RX ORDER — INHALER, ASSIST DEVICES
SPACER (EA) MISCELLANEOUS
Qty: 1 | Refills: 0 | Status: ACTIVE | COMMUNITY
Start: 2025-02-03 | End: 1900-01-01

## 2025-06-10 ENCOUNTER — APPOINTMENT (OUTPATIENT)
Dept: OTOLARYNGOLOGY | Facility: CLINIC | Age: 70
End: 2025-06-10
Payer: SELF-PAY

## 2025-06-10 PROCEDURE — 92593: CPT | Mod: NC

## 2025-07-28 ENCOUNTER — APPOINTMENT (OUTPATIENT)
Dept: ULTRASOUND IMAGING | Facility: CLINIC | Age: 70
End: 2025-07-28
Payer: COMMERCIAL

## 2025-07-28 ENCOUNTER — OUTPATIENT (OUTPATIENT)
Dept: OUTPATIENT SERVICES | Facility: HOSPITAL | Age: 70
LOS: 1 days | End: 2025-07-28

## 2025-07-28 PROCEDURE — 76705 ECHO EXAM OF ABDOMEN: CPT | Mod: 26

## 2025-08-04 ENCOUNTER — APPOINTMENT (OUTPATIENT)
Dept: PULMONOLOGY | Facility: CLINIC | Age: 70
End: 2025-08-04
Payer: COMMERCIAL

## 2025-08-04 VITALS
DIASTOLIC BLOOD PRESSURE: 89 MMHG | BODY MASS INDEX: 25.27 KG/M2 | HEART RATE: 72 BPM | WEIGHT: 148 LBS | OXYGEN SATURATION: 96 % | HEIGHT: 64 IN | TEMPERATURE: 97.3 F | SYSTOLIC BLOOD PRESSURE: 110 MMHG

## 2025-08-04 DIAGNOSIS — Z87.09 PERSONAL HISTORY OF OTHER DISEASES OF THE RESPIRATORY SYSTEM: ICD-10-CM

## 2025-08-04 DIAGNOSIS — J82.89 OTHER PULMONARY EOSINOPHILIA, NOT ELSEWHERE CLASSIFIED: ICD-10-CM

## 2025-08-04 DIAGNOSIS — J45.909 UNSPECIFIED ASTHMA, UNCOMPLICATED: ICD-10-CM

## 2025-08-04 PROCEDURE — 95012 NITRIC OXIDE EXP GAS DETER: CPT

## 2025-08-04 PROCEDURE — 94010 BREATHING CAPACITY TEST: CPT

## 2025-08-04 PROCEDURE — 99213 OFFICE O/P EST LOW 20 MIN: CPT | Mod: 25
